# Patient Record
Sex: FEMALE | Race: WHITE | Employment: OTHER | URBAN - METROPOLITAN AREA
[De-identification: names, ages, dates, MRNs, and addresses within clinical notes are randomized per-mention and may not be internally consistent; named-entity substitution may affect disease eponyms.]

---

## 2018-01-01 ENCOUNTER — HOSPITAL ENCOUNTER (INPATIENT)
Age: 83
LOS: 5 days | DRG: 374 | End: 2018-08-13
Attending: INTERNAL MEDICINE | Admitting: INTERNAL MEDICINE
Payer: COMMERCIAL

## 2018-01-01 ENCOUNTER — HOSPITAL ENCOUNTER (INPATIENT)
Age: 83
LOS: 6 days | Discharge: HOSPICE | DRG: 374 | End: 2018-08-08
Attending: INTERNAL MEDICINE | Admitting: INTERNAL MEDICINE
Payer: MEDICARE

## 2018-01-01 ENCOUNTER — APPOINTMENT (OUTPATIENT)
Dept: GENERAL RADIOLOGY | Age: 83
End: 2018-01-01
Payer: MEDICARE

## 2018-01-01 ENCOUNTER — APPOINTMENT (OUTPATIENT)
Dept: GENERAL RADIOLOGY | Age: 83
DRG: 374 | End: 2018-01-01
Attending: INTERNAL MEDICINE
Payer: MEDICARE

## 2018-01-01 ENCOUNTER — APPOINTMENT (OUTPATIENT)
Dept: CT IMAGING | Age: 83
DRG: 374 | End: 2018-01-01
Attending: INTERNAL MEDICINE
Payer: MEDICARE

## 2018-01-01 ENCOUNTER — HOSPITAL ENCOUNTER (EMERGENCY)
Age: 83
Discharge: HOME | End: 2018-07-26
Payer: MEDICARE

## 2018-01-01 ENCOUNTER — HOSPITAL ENCOUNTER (EMERGENCY)
Age: 83
Discharge: HOME | End: 2018-07-26
Attending: EMERGENCY MEDICINE
Payer: MEDICARE

## 2018-01-01 VITALS
SYSTOLIC BLOOD PRESSURE: 106 MMHG | HEART RATE: 95 BPM | BODY MASS INDEX: 22.22 KG/M2 | HEIGHT: 63 IN | DIASTOLIC BLOOD PRESSURE: 55 MMHG | RESPIRATION RATE: 18 BRPM | OXYGEN SATURATION: 98 % | TEMPERATURE: 100.6 F | WEIGHT: 125.4 LBS

## 2018-01-01 VITALS
SYSTOLIC BLOOD PRESSURE: 78 MMHG | RESPIRATION RATE: 28 BRPM | TEMPERATURE: 100.3 F | OXYGEN SATURATION: 94 % | DIASTOLIC BLOOD PRESSURE: 36 MMHG | HEART RATE: 114 BPM

## 2018-01-01 VITALS
TEMPERATURE: 100.1 F | DIASTOLIC BLOOD PRESSURE: 88 MMHG | BODY MASS INDEX: 22.68 KG/M2 | HEIGHT: 63 IN | WEIGHT: 128 LBS | SYSTOLIC BLOOD PRESSURE: 175 MMHG | OXYGEN SATURATION: 94 % | HEART RATE: 97 BPM | RESPIRATION RATE: 18 BRPM

## 2018-01-01 VITALS
SYSTOLIC BLOOD PRESSURE: 199 MMHG | DIASTOLIC BLOOD PRESSURE: 98 MMHG | HEART RATE: 106 BPM | TEMPERATURE: 98.1 F | OXYGEN SATURATION: 94 % | RESPIRATION RATE: 20 BRPM

## 2018-01-01 DIAGNOSIS — R50.9 FEBRILE ILLNESS: Primary | ICD-10-CM

## 2018-01-01 DIAGNOSIS — R53.83 FATIGUE, UNSPECIFIED TYPE: Primary | ICD-10-CM

## 2018-01-01 DIAGNOSIS — R50.9 FEVER OF UNKNOWN ORIGIN: ICD-10-CM

## 2018-01-01 LAB
ALBUMIN SERPL-MCNC: 2.5 G/DL (ref 3.5–5.2)
ALBUMIN SERPL-MCNC: 2.9 G/DL (ref 3.5–5.2)
ALBUMIN SERPL-MCNC: 3 G/DL (ref 3.5–5.2)
ALP BLD-CCNC: 101 U/L (ref 35–104)
ALP BLD-CCNC: 121 U/L (ref 35–104)
ALP BLD-CCNC: 151 U/L (ref 35–104)
ALT SERPL-CCNC: 18 U/L (ref 0–32)
ALT SERPL-CCNC: 21 U/L (ref 0–32)
ALT SERPL-CCNC: 26 U/L (ref 0–32)
AMORPHOUS: NORMAL
ANION GAP SERPL CALCULATED.3IONS-SCNC: 11 MMOL/L (ref 7–16)
ANION GAP SERPL CALCULATED.3IONS-SCNC: 12 MMOL/L (ref 7–16)
ANION GAP SERPL CALCULATED.3IONS-SCNC: 12 MMOL/L (ref 7–16)
AST SERPL-CCNC: 28 U/L (ref 0–31)
AST SERPL-CCNC: 33 U/L (ref 0–31)
AST SERPL-CCNC: 43 U/L (ref 0–31)
BACTERIA: ABNORMAL /HPF
BACTERIA: NORMAL /HPF
BASOPHILS ABSOLUTE: 0.05 E9/L (ref 0–0.2)
BASOPHILS ABSOLUTE: 0.06 E9/L (ref 0–0.2)
BASOPHILS RELATIVE PERCENT: 0.5 % (ref 0–2)
BASOPHILS RELATIVE PERCENT: 0.9 % (ref 0–2)
BILIRUB SERPL-MCNC: 0.5 MG/DL (ref 0–1.2)
BILIRUB SERPL-MCNC: 0.7 MG/DL (ref 0–1.2)
BILIRUB SERPL-MCNC: 0.8 MG/DL (ref 0–1.2)
BILIRUBIN URINE: NEGATIVE
BLOOD CULTURE, ROUTINE: NORMAL
BLOOD CULTURE, ROUTINE: NORMAL
BLOOD, URINE: NEGATIVE
BUN BLDV-MCNC: 18 MG/DL (ref 8–23)
BUN BLDV-MCNC: 19 MG/DL (ref 8–23)
BUN BLDV-MCNC: 9 MG/DL (ref 8–23)
CA 125: 91.6 U/ML (ref 0–35)
CA 19-9: 75 U/ML (ref 0–37)
CALCIUM SERPL-MCNC: 10.3 MG/DL (ref 8.6–10.2)
CALCIUM SERPL-MCNC: 8.6 MG/DL (ref 8.6–10.2)
CALCIUM SERPL-MCNC: 9 MG/DL (ref 8.6–10.2)
CEA: 2.3 NG/ML (ref 0–5.2)
CHLORIDE BLD-SCNC: 101 MMOL/L (ref 98–107)
CHLORIDE BLD-SCNC: 91 MMOL/L (ref 98–107)
CHLORIDE BLD-SCNC: 93 MMOL/L (ref 98–107)
CK MB: 11.1 NG/ML (ref 0–4.3)
CLARITY: CLEAR
CO2: 24 MMOL/L (ref 22–29)
CO2: 25 MMOL/L (ref 22–29)
CO2: 27 MMOL/L (ref 22–29)
COLOR: YELLOW
CREAT SERPL-MCNC: 0.7 MG/DL (ref 0.5–1)
CREAT SERPL-MCNC: 0.8 MG/DL (ref 0.5–1)
CREAT SERPL-MCNC: 0.8 MG/DL (ref 0.5–1)
CULTURE, BLOOD 2: NORMAL
CULTURE, BLOOD 2: NORMAL
EKG ATRIAL RATE: 108 BPM
EKG ATRIAL RATE: 159 BPM
EKG ATRIAL RATE: 88 BPM
EKG P AXIS: 44 DEGREES
EKG P AXIS: 74 DEGREES
EKG P-R INTERVAL: 176 MS
EKG P-R INTERVAL: 190 MS
EKG Q-T INTERVAL: 294 MS
EKG Q-T INTERVAL: 306 MS
EKG Q-T INTERVAL: 346 MS
EKG QRS DURATION: 162 MS
EKG QRS DURATION: 76 MS
EKG QRS DURATION: 80 MS
EKG QTC CALCULATION (BAZETT): 410 MS
EKG QTC CALCULATION (BAZETT): 418 MS
EKG QTC CALCULATION (BAZETT): 473 MS
EKG R AXIS: 24 DEGREES
EKG R AXIS: 43 DEGREES
EKG R AXIS: 49 DEGREES
EKG T AXIS: 35 DEGREES
EKG T AXIS: 57 DEGREES
EKG T AXIS: 97 DEGREES
EKG VENTRICULAR RATE: 108 BPM
EKG VENTRICULAR RATE: 156 BPM
EKG VENTRICULAR RATE: 88 BPM
EOSINOPHILS ABSOLUTE: 0 E9/L (ref 0.05–0.5)
EOSINOPHILS ABSOLUTE: 0.06 E9/L (ref 0.05–0.5)
EOSINOPHILS RELATIVE PERCENT: 0.7 % (ref 0–6)
EOSINOPHILS RELATIVE PERCENT: 0.9 % (ref 0–6)
EPITHELIAL CELLS, UA: ABNORMAL /HPF
FERRITIN: 139 NG/ML
FOLATE: 8.6 NG/ML (ref 4.8–24.2)
GFR AFRICAN AMERICAN: >60
GFR NON-AFRICAN AMERICAN: >60 ML/MIN/1.73
GLUCOSE BLD-MCNC: 89 MG/DL (ref 74–109)
GLUCOSE BLD-MCNC: 95 MG/DL (ref 74–109)
GLUCOSE BLD-MCNC: 96 MG/DL (ref 74–109)
GLUCOSE URINE: NEGATIVE MG/DL
HCT VFR BLD CALC: 31.4 % (ref 34–48)
HCT VFR BLD CALC: 32.4 % (ref 34–48)
HCT VFR BLD CALC: 36.5 % (ref 34–48)
HEMOGLOBIN: 10.7 G/DL (ref 11.5–15.5)
HEMOGLOBIN: 10.8 G/DL (ref 11.5–15.5)
HEMOGLOBIN: 12.2 G/DL (ref 11.5–15.5)
IRON SATURATION: 32 % (ref 15–50)
IRON: 58 MCG/DL (ref 37–145)
KETONES, URINE: NEGATIVE MG/DL
LACTIC ACID: 1.6 MMOL/L (ref 0.5–2.2)
LEUKOCYTE ESTERASE, URINE: NEGATIVE
LIPASE: 38 U/L (ref 13–60)
LIPASE: 58 U/L (ref 13–60)
LV EF: 62 %
LVEF MODALITY: NORMAL
LYMPHOCYTES ABSOLUTE: 0.63 E9/L (ref 1.5–4)
LYMPHOCYTES ABSOLUTE: 1.18 E9/L (ref 1.5–4)
LYMPHOCYTES RELATIVE PERCENT: 11.1 % (ref 20–42)
LYMPHOCYTES RELATIVE PERCENT: 8.7 % (ref 20–42)
MCH RBC QN AUTO: 27.1 PG (ref 26–35)
MCH RBC QN AUTO: 27.4 PG (ref 26–35)
MCH RBC QN AUTO: 27.5 PG (ref 26–35)
MCHC RBC AUTO-ENTMCNC: 33.3 % (ref 32–34.5)
MCHC RBC AUTO-ENTMCNC: 33.4 % (ref 32–34.5)
MCHC RBC AUTO-ENTMCNC: 34.1 % (ref 32–34.5)
MCV RBC AUTO: 80.5 FL (ref 80–99.9)
MCV RBC AUTO: 81.2 FL (ref 80–99.9)
MCV RBC AUTO: 82.2 FL (ref 80–99.9)
METER GLUCOSE: 98 MG/DL (ref 70–110)
MONOCYTES ABSOLUTE: 1.89 E9/L (ref 0.1–0.95)
MONOCYTES ABSOLUTE: 3 E9/L (ref 0.1–0.95)
MONOCYTES RELATIVE PERCENT: 27 % (ref 2–12)
MONOCYTES RELATIVE PERCENT: 28.1 % (ref 2–12)
NEUTROPHILS ABSOLUTE: 4.41 E9/L (ref 1.8–7.3)
NEUTROPHILS ABSOLUTE: 6.42 E9/L (ref 1.8–7.3)
NEUTROPHILS RELATIVE PERCENT: 60.3 % (ref 43–80)
NEUTROPHILS RELATIVE PERCENT: 62.6 % (ref 43–80)
NITRITE, URINE: NEGATIVE
OVALOCYTES: ABNORMAL
PDW BLD-RTO: 14.5 FL (ref 11.5–15)
PDW BLD-RTO: 14.6 FL (ref 11.5–15)
PDW BLD-RTO: 14.8 FL (ref 11.5–15)
PH UA: 7 (ref 5–9)
PLATELET # BLD: 274 E9/L (ref 130–450)
PLATELET # BLD: 279 E9/L (ref 130–450)
PLATELET # BLD: 314 E9/L (ref 130–450)
PMV BLD AUTO: 10.5 FL (ref 7–12)
PMV BLD AUTO: 10.6 FL (ref 7–12)
PMV BLD AUTO: 10.7 FL (ref 7–12)
POIKILOCYTES: ABNORMAL
POTASSIUM SERPL-SCNC: 3.4 MMOL/L (ref 3.5–5)
POTASSIUM SERPL-SCNC: 3.5 MMOL/L (ref 3.5–5)
POTASSIUM SERPL-SCNC: 5.3 MMOL/L (ref 3.5–5)
PRO-BNP: 966 PG/ML (ref 0–450)
PROTEIN UA: NEGATIVE MG/DL
PROTEIN UA: NEGATIVE MG/DL
PROTEIN UA: NORMAL MG/DL
RBC # BLD: 3.9 E12/L (ref 3.5–5.5)
RBC # BLD: 3.99 E12/L (ref 3.5–5.5)
RBC # BLD: 4.44 E12/L (ref 3.5–5.5)
RBC UA: ABNORMAL /HPF (ref 0–2)
RBC UA: NORMAL /HPF (ref 0–2)
SODIUM BLD-SCNC: 130 MMOL/L (ref 132–146)
SODIUM BLD-SCNC: 130 MMOL/L (ref 132–146)
SODIUM BLD-SCNC: 136 MMOL/L (ref 132–146)
SPECIFIC GRAVITY UA: 1.01 (ref 1–1.03)
SPECIFIC GRAVITY UA: 1.01 (ref 1–1.03)
SPECIFIC GRAVITY UA: <=1.005 (ref 1–1.03)
TOTAL CK: 106 U/L (ref 20–180)
TOTAL IRON BINDING CAPACITY: 184 MCG/DL (ref 250–450)
TOTAL PROTEIN: 6.9 G/DL (ref 6.4–8.3)
TOTAL PROTEIN: 7.5 G/DL (ref 6.4–8.3)
TOTAL PROTEIN: 8.2 G/DL (ref 6.4–8.3)
TOXIC GRANULATION: ABNORMAL
TROPONIN: 0.56 NG/ML (ref 0–0.03)
TROPONIN: <0.01 NG/ML (ref 0–0.03)
URINE CULTURE, ROUTINE: NORMAL
UROBILINOGEN, URINE: 0.2 E.U./DL
VACUOLATED NEUTROPHILS: ABNORMAL
VITAMIN B-12: 621 PG/ML (ref 211–946)
WBC # BLD: 10.7 E9/L (ref 4.5–11.5)
WBC # BLD: 11.3 E9/L (ref 4.5–11.5)
WBC # BLD: 7 E9/L (ref 4.5–11.5)
WBC UA: ABNORMAL /HPF (ref 0–5)
WBC UA: NORMAL /HPF (ref 0–5)

## 2018-01-01 PROCEDURE — 6360000002 HC RX W HCPCS

## 2018-01-01 PROCEDURE — 87088 URINE BACTERIA CULTURE: CPT

## 2018-01-01 PROCEDURE — 6360000002 HC RX W HCPCS: Performed by: INTERNAL MEDICINE

## 2018-01-01 PROCEDURE — 2500000003 HC RX 250 WO HCPCS: Performed by: INTERNAL MEDICINE

## 2018-01-01 PROCEDURE — 83690 ASSAY OF LIPASE: CPT

## 2018-01-01 PROCEDURE — 6370000000 HC RX 637 (ALT 250 FOR IP): Performed by: INTERNAL MEDICINE

## 2018-01-01 PROCEDURE — 1200000000 HC SEMI PRIVATE

## 2018-01-01 PROCEDURE — 2580000003 HC RX 258: Performed by: INTERNAL MEDICINE

## 2018-01-01 PROCEDURE — 36415 COLL VENOUS BLD VENIPUNCTURE: CPT

## 2018-01-01 PROCEDURE — 82553 CREATINE MB FRACTION: CPT

## 2018-01-01 PROCEDURE — 80053 COMPREHEN METABOLIC PANEL: CPT

## 2018-01-01 PROCEDURE — 6370000000 HC RX 637 (ALT 250 FOR IP): Performed by: NURSE PRACTITIONER

## 2018-01-01 PROCEDURE — 83540 ASSAY OF IRON: CPT

## 2018-01-01 PROCEDURE — 93005 ELECTROCARDIOGRAM TRACING: CPT | Performed by: INTERNAL MEDICINE

## 2018-01-01 PROCEDURE — 87040 BLOOD CULTURE FOR BACTERIA: CPT

## 2018-01-01 PROCEDURE — 82607 VITAMIN B-12: CPT

## 2018-01-01 PROCEDURE — C9113 INJ PANTOPRAZOLE SODIUM, VIA: HCPCS | Performed by: INTERNAL MEDICINE

## 2018-01-01 PROCEDURE — 81003 URINALYSIS AUTO W/O SCOPE: CPT

## 2018-01-01 PROCEDURE — 83550 IRON BINDING TEST: CPT

## 2018-01-01 PROCEDURE — 82746 ASSAY OF FOLIC ACID SERUM: CPT

## 2018-01-01 PROCEDURE — 3609008400 HC SIGMOIDOSCOPY DIAGNOSTIC: Performed by: INTERNAL MEDICINE

## 2018-01-01 PROCEDURE — 86301 IMMUNOASSAY TUMOR CA 19-9: CPT

## 2018-01-01 PROCEDURE — 85025 COMPLETE CBC W/AUTO DIFF WBC: CPT

## 2018-01-01 PROCEDURE — 86304 IMMUNOASSAY TUMOR CA 125: CPT

## 2018-01-01 PROCEDURE — 81001 URINALYSIS AUTO W/SCOPE: CPT

## 2018-01-01 PROCEDURE — 99222 1ST HOSP IP/OBS MODERATE 55: CPT | Performed by: SURGERY

## 2018-01-01 PROCEDURE — 6360000004 HC RX CONTRAST MEDICATION: Performed by: RADIOLOGY

## 2018-01-01 PROCEDURE — 2700000000 HC OXYGEN THERAPY PER DAY

## 2018-01-01 PROCEDURE — 6370000000 HC RX 637 (ALT 250 FOR IP): Performed by: PHYSICIAN ASSISTANT

## 2018-01-01 PROCEDURE — 84484 ASSAY OF TROPONIN QUANT: CPT

## 2018-01-01 PROCEDURE — 71045 X-RAY EXAM CHEST 1 VIEW: CPT

## 2018-01-01 PROCEDURE — 83880 ASSAY OF NATRIURETIC PEPTIDE: CPT

## 2018-01-01 PROCEDURE — 82378 CARCINOEMBRYONIC ANTIGEN: CPT

## 2018-01-01 PROCEDURE — 82962 GLUCOSE BLOOD TEST: CPT

## 2018-01-01 PROCEDURE — 74178 CT ABD&PLV WO CNTR FLWD CNTR: CPT

## 2018-01-01 PROCEDURE — 2709999900 HC NON-CHARGEABLE SUPPLY: Performed by: INTERNAL MEDICINE

## 2018-01-01 PROCEDURE — 82728 ASSAY OF FERRITIN: CPT

## 2018-01-01 PROCEDURE — 83605 ASSAY OF LACTIC ACID: CPT

## 2018-01-01 PROCEDURE — 99231 SBSQ HOSP IP/OBS SF/LOW 25: CPT | Performed by: SURGERY

## 2018-01-01 PROCEDURE — 0DJD8ZZ INSPECTION OF LOWER INTESTINAL TRACT, VIA NATURAL OR ARTIFICIAL OPENING ENDOSCOPIC: ICD-10-PCS | Performed by: INTERNAL MEDICINE

## 2018-01-01 PROCEDURE — 2580000003 HC RX 258: Performed by: PHYSICIAN ASSISTANT

## 2018-01-01 PROCEDURE — 82550 ASSAY OF CK (CPK): CPT

## 2018-01-01 PROCEDURE — 93306 TTE W/DOPPLER COMPLETE: CPT

## 2018-01-01 PROCEDURE — 85027 COMPLETE CBC AUTOMATED: CPT

## 2018-01-01 PROCEDURE — 99212 OFFICE O/P EST SF 10 MIN: CPT

## 2018-01-01 PROCEDURE — 71046 X-RAY EXAM CHEST 2 VIEWS: CPT

## 2018-01-01 PROCEDURE — 99285 EMERGENCY DEPT VISIT HI MDM: CPT

## 2018-01-01 RX ORDER — CLONIDINE HYDROCHLORIDE 0.1 MG/1
0.1 TABLET ORAL 2 TIMES DAILY
Status: DISCONTINUED | OUTPATIENT
Start: 2018-01-01 | End: 2018-01-01

## 2018-01-01 RX ORDER — LORAZEPAM 2 MG/ML
0.5 INJECTION INTRAMUSCULAR EVERY 4 HOURS PRN
Status: DISCONTINUED | OUTPATIENT
Start: 2018-01-01 | End: 2018-08-14 | Stop reason: HOSPADM

## 2018-01-01 RX ORDER — FUROSEMIDE 10 MG/ML
INJECTION INTRAMUSCULAR; INTRAVENOUS
Status: COMPLETED
Start: 2018-01-01 | End: 2018-01-01

## 2018-01-01 RX ORDER — BISACODYL 10 MG
10 SUPPOSITORY, RECTAL RECTAL DAILY PRN
Status: CANCELLED | OUTPATIENT
Start: 2018-01-01

## 2018-01-01 RX ORDER — GLYCOPYRROLATE 0.2 MG/ML
0.4 INJECTION INTRAMUSCULAR; INTRAVENOUS 3 TIMES DAILY
Status: CANCELLED | OUTPATIENT
Start: 2018-01-01

## 2018-01-01 RX ORDER — POTASSIUM CHLORIDE 20 MEQ/1
20 TABLET, EXTENDED RELEASE ORAL ONCE
Status: COMPLETED | OUTPATIENT
Start: 2018-01-01 | End: 2018-01-01

## 2018-01-01 RX ORDER — ACETAMINOPHEN 325 MG/1
650 TABLET ORAL ONCE
Status: COMPLETED | OUTPATIENT
Start: 2018-01-01 | End: 2018-01-01

## 2018-01-01 RX ORDER — ONDANSETRON 2 MG/ML
8 INJECTION INTRAMUSCULAR; INTRAVENOUS EVERY 8 HOURS PRN
Status: CANCELLED | OUTPATIENT
Start: 2018-01-01

## 2018-01-01 RX ORDER — METOPROLOL SUCCINATE 50 MG/1
50 TABLET, EXTENDED RELEASE ORAL DAILY
Status: DISCONTINUED | OUTPATIENT
Start: 2018-01-01 | End: 2018-01-01

## 2018-01-01 RX ORDER — CIPROFLOXACIN 2 MG/ML
400 INJECTION, SOLUTION INTRAVENOUS EVERY 12 HOURS
Status: DISCONTINUED | OUTPATIENT
Start: 2018-01-01 | End: 2018-01-01

## 2018-01-01 RX ORDER — PANTOPRAZOLE SODIUM 40 MG/1
40 TABLET, DELAYED RELEASE ORAL
Status: DISCONTINUED | OUTPATIENT
Start: 2018-01-01 | End: 2018-01-01 | Stop reason: SDUPTHER

## 2018-01-01 RX ORDER — PROMETHAZINE HYDROCHLORIDE 25 MG/ML
25 INJECTION, SOLUTION INTRAMUSCULAR; INTRAVENOUS EVERY 6 HOURS PRN
Status: DISCONTINUED | OUTPATIENT
Start: 2018-01-01 | End: 2018-01-01

## 2018-01-01 RX ORDER — LORAZEPAM 2 MG/ML
0.4 INJECTION INTRAMUSCULAR EVERY 4 HOURS PRN
Status: DISCONTINUED | OUTPATIENT
Start: 2018-01-01 | End: 2018-01-01

## 2018-01-01 RX ORDER — ONDANSETRON 2 MG/ML
4 INJECTION INTRAMUSCULAR; INTRAVENOUS ONCE
Status: COMPLETED | OUTPATIENT
Start: 2018-01-01 | End: 2018-01-01

## 2018-01-01 RX ORDER — CLONIDINE HYDROCHLORIDE 0.1 MG/1
0.1 TABLET ORAL 2 TIMES DAILY
Status: DISCONTINUED | OUTPATIENT
Start: 2018-01-01 | End: 2018-01-01 | Stop reason: HOSPADM

## 2018-01-01 RX ORDER — 0.9 % SODIUM CHLORIDE 0.9 %
10 VIAL (ML) INJECTION DAILY
Status: DISCONTINUED | OUTPATIENT
Start: 2018-01-01 | End: 2018-01-01 | Stop reason: HOSPADM

## 2018-01-01 RX ORDER — 0.9 % SODIUM CHLORIDE 0.9 %
1000 INTRAVENOUS SOLUTION INTRAVENOUS ONCE
Status: COMPLETED | OUTPATIENT
Start: 2018-01-01 | End: 2018-01-01

## 2018-01-01 RX ORDER — ASPIRIN 325 MG
325 TABLET ORAL DAILY
Status: ON HOLD | COMMUNITY
End: 2018-01-01

## 2018-01-01 RX ORDER — ACETAMINOPHEN 650 MG/1
650 SUPPOSITORY RECTAL EVERY 4 HOURS PRN
Status: CANCELLED | OUTPATIENT
Start: 2018-01-01

## 2018-01-01 RX ORDER — ONDANSETRON 2 MG/ML
8 INJECTION INTRAMUSCULAR; INTRAVENOUS EVERY 8 HOURS PRN
Status: DISCONTINUED | OUTPATIENT
Start: 2018-01-01 | End: 2018-08-14 | Stop reason: HOSPADM

## 2018-01-01 RX ORDER — GLYCOPYRROLATE 0.2 MG/ML
0.4 INJECTION INTRAMUSCULAR; INTRAVENOUS 3 TIMES DAILY
Status: DISCONTINUED | OUTPATIENT
Start: 2018-01-01 | End: 2018-08-14 | Stop reason: HOSPADM

## 2018-01-01 RX ORDER — METOPROLOL SUCCINATE 25 MG/1
50 TABLET, EXTENDED RELEASE ORAL ONCE
Status: COMPLETED | OUTPATIENT
Start: 2018-01-01 | End: 2018-01-01

## 2018-01-01 RX ORDER — BISACODYL 10 MG
10 SUPPOSITORY, RECTAL RECTAL DAILY PRN
Status: DISCONTINUED | OUTPATIENT
Start: 2018-01-01 | End: 2018-08-14 | Stop reason: HOSPADM

## 2018-01-01 RX ORDER — PANTOPRAZOLE SODIUM 40 MG/10ML
40 INJECTION, POWDER, LYOPHILIZED, FOR SOLUTION INTRAVENOUS DAILY
Status: DISCONTINUED | OUTPATIENT
Start: 2018-01-01 | End: 2018-01-01 | Stop reason: HOSPADM

## 2018-01-01 RX ORDER — LORAZEPAM 2 MG/ML
0.4 INJECTION INTRAMUSCULAR EVERY 4 HOURS PRN
Status: CANCELLED | OUTPATIENT
Start: 2018-01-01

## 2018-01-01 RX ORDER — ONDANSETRON 2 MG/ML
4 INJECTION INTRAMUSCULAR; INTRAVENOUS EVERY 8 HOURS PRN
Status: DISCONTINUED | OUTPATIENT
Start: 2018-01-01 | End: 2018-01-01 | Stop reason: SDUPTHER

## 2018-01-01 RX ORDER — SODIUM CHLORIDE 9 MG/ML
INJECTION, SOLUTION INTRAVENOUS CONTINUOUS
Status: DISCONTINUED | OUTPATIENT
Start: 2018-01-01 | End: 2018-01-01

## 2018-01-01 RX ORDER — CLONIDINE HYDROCHLORIDE 0.2 MG/1
0.2 TABLET ORAL 2 TIMES DAILY
Status: DISCONTINUED | OUTPATIENT
Start: 2018-01-01 | End: 2018-01-01

## 2018-01-01 RX ORDER — ACETAMINOPHEN 650 MG/1
650 SUPPOSITORY RECTAL EVERY 4 HOURS PRN
Status: DISCONTINUED | OUTPATIENT
Start: 2018-01-01 | End: 2018-01-01 | Stop reason: HOSPADM

## 2018-01-01 RX ORDER — CLOTRIMAZOLE AND BETAMETHASONE DIPROPIONATE 10; .64 MG/G; MG/G
CREAM TOPICAL 2 TIMES DAILY
Status: DISCONTINUED | OUTPATIENT
Start: 2018-01-01 | End: 2018-08-14 | Stop reason: HOSPADM

## 2018-01-01 RX ORDER — ONDANSETRON 2 MG/ML
8 INJECTION INTRAMUSCULAR; INTRAVENOUS EVERY 8 HOURS PRN
Status: DISCONTINUED | OUTPATIENT
Start: 2018-01-01 | End: 2018-01-01 | Stop reason: HOSPADM

## 2018-01-01 RX ORDER — CLONIDINE HYDROCHLORIDE 0.1 MG/1
0.3 TABLET ORAL NIGHTLY
Status: ON HOLD | COMMUNITY
End: 2018-01-01

## 2018-01-01 RX ORDER — ACETAMINOPHEN 650 MG/1
650 SUPPOSITORY RECTAL EVERY 4 HOURS PRN
Status: DISCONTINUED | OUTPATIENT
Start: 2018-01-01 | End: 2018-08-14 | Stop reason: HOSPADM

## 2018-01-01 RX ORDER — HYDROCODONE BITARTRATE AND ACETAMINOPHEN 5; 325 MG/1; MG/1
1 TABLET ORAL EVERY 6 HOURS PRN
Status: DISCONTINUED | OUTPATIENT
Start: 2018-01-01 | End: 2018-01-01

## 2018-01-01 RX ORDER — DOCUSATE SODIUM 100 MG/1
200 CAPSULE, LIQUID FILLED ORAL 2 TIMES DAILY PRN
Status: ON HOLD | COMMUNITY
Start: 2006-10-12 | End: 2018-01-01

## 2018-01-01 RX ORDER — FUROSEMIDE 10 MG/ML
40 INJECTION INTRAMUSCULAR; INTRAVENOUS ONCE
Status: COMPLETED | OUTPATIENT
Start: 2018-01-01 | End: 2018-01-01

## 2018-01-01 RX ADMIN — CIPROFLOXACIN 400 MG: 2 INJECTION, SOLUTION INTRAVENOUS at 21:08

## 2018-01-01 RX ADMIN — CLONIDINE HYDROCHLORIDE 0.2 MG: 0.2 TABLET ORAL at 08:29

## 2018-01-01 RX ADMIN — GLYCOPYRROLATE 0.4 MG: 0.2 INJECTION, SOLUTION INTRAMUSCULAR; INTRAVENOUS at 09:51

## 2018-01-01 RX ADMIN — CLOTRIMAZOLE AND BETAMETHASONE DIPROPIONATE: 10; .5 CREAM TOPICAL at 08:28

## 2018-01-01 RX ADMIN — POTASSIUM CHLORIDE 20 MEQ: 20 TABLET, EXTENDED RELEASE ORAL at 20:57

## 2018-01-01 RX ADMIN — SODIUM CHLORIDE 1000 ML: 9 INJECTION, SOLUTION INTRAVENOUS at 18:50

## 2018-01-01 RX ADMIN — FUROSEMIDE 40 MG: 10 INJECTION, SOLUTION INTRAMUSCULAR; INTRAVENOUS at 22:04

## 2018-01-01 RX ADMIN — METRONIDAZOLE 500 MG: 500 INJECTION, SOLUTION INTRAVENOUS at 10:12

## 2018-01-01 RX ADMIN — GLYCOPYRROLATE 0.4 MG: 0.2 INJECTION, SOLUTION INTRAMUSCULAR; INTRAVENOUS at 14:18

## 2018-01-01 RX ADMIN — METRONIDAZOLE 500 MG: 500 INJECTION, SOLUTION INTRAVENOUS at 10:32

## 2018-01-01 RX ADMIN — ONDANSETRON 4 MG: 2 INJECTION INTRAMUSCULAR; INTRAVENOUS at 22:29

## 2018-01-01 RX ADMIN — GLYCOPYRROLATE 0.4 MG: 0.2 INJECTION, SOLUTION INTRAMUSCULAR; INTRAVENOUS at 09:17

## 2018-01-01 RX ADMIN — GLYCOPYRROLATE 0.4 MG: 0.2 INJECTION, SOLUTION INTRAMUSCULAR; INTRAVENOUS at 14:28

## 2018-01-01 RX ADMIN — MORPHINE SULFATE 2 MG/HR: 25 INJECTION, SOLUTION, CONCENTRATE INTRAVENOUS at 01:51

## 2018-01-01 RX ADMIN — CLONIDINE HYDROCHLORIDE 0.1 MG: 0.1 TABLET ORAL at 21:29

## 2018-01-01 RX ADMIN — METRONIDAZOLE 500 MG: 500 INJECTION, SOLUTION INTRAVENOUS at 08:29

## 2018-01-01 RX ADMIN — GLYCOPYRROLATE 0.4 MG: 0.2 INJECTION, SOLUTION INTRAMUSCULAR; INTRAVENOUS at 08:48

## 2018-01-01 RX ADMIN — PANTOPRAZOLE SODIUM 40 MG: 40 TABLET, DELAYED RELEASE ORAL at 06:09

## 2018-01-01 RX ADMIN — GLYCOPYRROLATE 0.4 MG: 0.2 INJECTION, SOLUTION INTRAMUSCULAR; INTRAVENOUS at 08:27

## 2018-01-01 RX ADMIN — ACETAMINOPHEN 650 MG: 650 SUPPOSITORY RECTAL at 20:08

## 2018-01-01 RX ADMIN — METOPROLOL SUCCINATE 50 MG: 50 TABLET, EXTENDED RELEASE ORAL at 10:32

## 2018-01-01 RX ADMIN — CLONIDINE HYDROCHLORIDE 0.1 MG: 0.1 TABLET ORAL at 20:56

## 2018-01-01 RX ADMIN — GLYCOPYRROLATE 0.4 MG: 0.2 INJECTION, SOLUTION INTRAMUSCULAR; INTRAVENOUS at 15:03

## 2018-01-01 RX ADMIN — CIPROFLOXACIN 400 MG: 2 INJECTION, SOLUTION INTRAVENOUS at 03:06

## 2018-01-01 RX ADMIN — GLYCOPYRROLATE 0.4 MG: 0.2 INJECTION, SOLUTION INTRAMUSCULAR; INTRAVENOUS at 21:45

## 2018-01-01 RX ADMIN — CLONIDINE HYDROCHLORIDE 0.1 MG: 0.1 TABLET ORAL at 21:35

## 2018-01-01 RX ADMIN — PANTOPRAZOLE SODIUM 40 MG: 40 INJECTION, POWDER, FOR SOLUTION INTRAVENOUS at 22:42

## 2018-01-01 RX ADMIN — FUROSEMIDE 40 MG: 10 INJECTION INTRAMUSCULAR; INTRAVENOUS at 22:04

## 2018-01-01 RX ADMIN — METRONIDAZOLE 500 MG: 500 INJECTION, SOLUTION INTRAVENOUS at 20:56

## 2018-01-01 RX ADMIN — SODIUM CHLORIDE: 9 INJECTION, SOLUTION INTRAVENOUS at 08:29

## 2018-01-01 RX ADMIN — SODIUM CHLORIDE 10 ML: 9 INJECTION INTRAMUSCULAR; INTRAVENOUS; SUBCUTANEOUS at 22:42

## 2018-01-01 RX ADMIN — METOPROLOL SUCCINATE 50 MG: 50 TABLET, EXTENDED RELEASE ORAL at 21:08

## 2018-01-01 RX ADMIN — SODIUM CHLORIDE 10 ML: 9 INJECTION INTRAMUSCULAR; INTRAVENOUS; SUBCUTANEOUS at 09:46

## 2018-01-01 RX ADMIN — METOPROLOL SUCCINATE 50 MG: 50 TABLET, EXTENDED RELEASE ORAL at 09:53

## 2018-01-01 RX ADMIN — METRONIDAZOLE 500 MG: 500 INJECTION, SOLUTION INTRAVENOUS at 20:58

## 2018-01-01 RX ADMIN — GLYCOPYRROLATE 0.4 MG: 0.2 INJECTION, SOLUTION INTRAMUSCULAR; INTRAVENOUS at 09:22

## 2018-01-01 RX ADMIN — ACETAMINOPHEN 650 MG: 325 TABLET, FILM COATED ORAL at 20:34

## 2018-01-01 RX ADMIN — CIPROFLOXACIN 400 MG: 2 INJECTION, SOLUTION INTRAVENOUS at 13:00

## 2018-01-01 RX ADMIN — GLYCOPYRROLATE 0.4 MG: 0.2 INJECTION, SOLUTION INTRAMUSCULAR; INTRAVENOUS at 21:11

## 2018-01-01 RX ADMIN — MORPHINE SULFATE 3 MG/HR: 25 INJECTION, SOLUTION INTRAVENOUS at 09:33

## 2018-01-01 RX ADMIN — CIPROFLOXACIN 400 MG: 2 INJECTION, SOLUTION INTRAVENOUS at 14:53

## 2018-01-01 RX ADMIN — GLYCOPYRROLATE 0.4 MG: 0.2 INJECTION, SOLUTION INTRAMUSCULAR; INTRAVENOUS at 14:26

## 2018-01-01 RX ADMIN — MORPHINE SULFATE 3 MG/HR: 25 INJECTION, SOLUTION INTRAVENOUS at 22:44

## 2018-01-01 RX ADMIN — CIPROFLOXACIN 400 MG: 2 INJECTION, SOLUTION INTRAVENOUS at 14:29

## 2018-01-01 RX ADMIN — SODIUM CHLORIDE 10 ML: 9 INJECTION INTRAMUSCULAR; INTRAVENOUS; SUBCUTANEOUS at 09:37

## 2018-01-01 RX ADMIN — ACETAMINOPHEN 650 MG: 650 SUPPOSITORY RECTAL at 22:44

## 2018-01-01 RX ADMIN — GLYCOPYRROLATE 0.4 MG: 0.2 INJECTION, SOLUTION INTRAMUSCULAR; INTRAVENOUS at 21:05

## 2018-01-01 RX ADMIN — ONDANSETRON 8 MG: 2 INJECTION INTRAMUSCULAR; INTRAVENOUS at 10:01

## 2018-01-01 RX ADMIN — METRONIDAZOLE 500 MG: 500 INJECTION, SOLUTION INTRAVENOUS at 21:08

## 2018-01-01 RX ADMIN — CLONIDINE HYDROCHLORIDE 0.2 MG: 0.2 TABLET ORAL at 20:14

## 2018-01-01 RX ADMIN — GLYCOPYRROLATE 0.4 MG: 0.2 INJECTION, SOLUTION INTRAMUSCULAR; INTRAVENOUS at 20:08

## 2018-01-01 RX ADMIN — METOPROLOL SUCCINATE 50 MG: 25 TABLET, EXTENDED RELEASE ORAL at 19:25

## 2018-01-01 RX ADMIN — CLONIDINE HYDROCHLORIDE 0.2 MG: 0.2 TABLET ORAL at 20:57

## 2018-01-01 RX ADMIN — IOHEXOL 50 ML: 240 INJECTION, SOLUTION INTRATHECAL; INTRAVASCULAR; INTRAVENOUS; ORAL at 01:16

## 2018-01-01 RX ADMIN — GLYCOPYRROLATE 0.4 MG: 0.2 INJECTION, SOLUTION INTRAMUSCULAR; INTRAVENOUS at 14:22

## 2018-01-01 RX ADMIN — PANTOPRAZOLE SODIUM 40 MG: 40 INJECTION, POWDER, FOR SOLUTION INTRAVENOUS at 09:46

## 2018-01-01 RX ADMIN — CLONIDINE HYDROCHLORIDE 0.1 MG: 0.1 TABLET ORAL at 10:34

## 2018-01-01 RX ADMIN — MORPHINE SULFATE 2 MG/HR: 25 INJECTION, SOLUTION, CONCENTRATE INTRAVENOUS at 19:51

## 2018-01-01 RX ADMIN — PANTOPRAZOLE SODIUM 40 MG: 40 INJECTION, POWDER, FOR SOLUTION INTRAVENOUS at 09:37

## 2018-01-01 RX ADMIN — METRONIDAZOLE 500 MG: 500 INJECTION, SOLUTION INTRAVENOUS at 20:15

## 2018-01-01 RX ADMIN — CLONIDINE HYDROCHLORIDE 0.1 MG: 0.1 TABLET ORAL at 09:53

## 2018-01-01 RX ADMIN — SODIUM CHLORIDE: 9 INJECTION, SOLUTION INTRAVENOUS at 01:08

## 2018-01-01 RX ADMIN — ACETAMINOPHEN 650 MG: 650 SUPPOSITORY RECTAL at 10:16

## 2018-01-01 RX ADMIN — MORPHINE SULFATE 2 MG/HR: 25 INJECTION, SOLUTION INTRAVENOUS at 21:43

## 2018-01-01 RX ADMIN — ONDANSETRON 4 MG: 2 INJECTION INTRAMUSCULAR; INTRAVENOUS at 21:30

## 2018-01-01 RX ADMIN — ONDANSETRON HYDROCHLORIDE 4 MG: 2 INJECTION, SOLUTION INTRAMUSCULAR; INTRAVENOUS at 22:34

## 2018-01-01 RX ADMIN — IOPAMIDOL 80 ML: 755 INJECTION, SOLUTION INTRAVENOUS at 01:16

## 2018-01-01 RX ADMIN — METOPROLOL SUCCINATE 50 MG: 50 TABLET, EXTENDED RELEASE ORAL at 08:29

## 2018-01-01 RX ADMIN — VANCOMYCIN HYDROCHLORIDE 1000 MG: 1 INJECTION, POWDER, LYOPHILIZED, FOR SOLUTION INTRAVENOUS at 22:33

## 2018-01-01 RX ADMIN — SODIUM CHLORIDE: 9 INJECTION, SOLUTION INTRAVENOUS at 18:18

## 2018-01-01 RX ADMIN — GLYCOPYRROLATE 0.4 MG: 0.2 INJECTION, SOLUTION INTRAMUSCULAR; INTRAVENOUS at 22:03

## 2018-01-01 RX ADMIN — CLOTRIMAZOLE AND BETAMETHASONE DIPROPIONATE: 10; .5 CREAM TOPICAL at 23:26

## 2018-01-01 RX ADMIN — GLYCOPYRROLATE 0.4 MG: 0.2 INJECTION, SOLUTION INTRAMUSCULAR; INTRAVENOUS at 14:15

## 2018-01-01 RX ADMIN — SODIUM CHLORIDE: 9 INJECTION, SOLUTION INTRAVENOUS at 21:08

## 2018-01-01 RX ADMIN — ONDANSETRON 4 MG: 2 INJECTION INTRAMUSCULAR; INTRAVENOUS at 20:15

## 2018-01-01 RX ADMIN — CIPROFLOXACIN 400 MG: 2 INJECTION, SOLUTION INTRAVENOUS at 20:56

## 2018-01-01 ASSESSMENT — PAIN SCALES - GENERAL
PAINLEVEL_OUTOF10: 0
PAINLEVEL_OUTOF10: 0
PAINLEVEL_OUTOF10: 7
PAINLEVEL_OUTOF10: 0

## 2018-07-26 NOTE — ED PROVIDER NOTES
ED Attending  CC: Yes    HPI:  7/26/18, Time: 5:50PM.       Lindsay Rosenthal is a 80 y.o. female presenting to the ED for evaluation of worsening fatigue as well as intermittent fever, beginning over the last 3 weeks. The patient apparently was driving a vehicle 15 days ago and has since declined. Her daughter reports that she is very independent and ambulates with a cane and her baseline mental status is stable. The patient has a history of aspiration pneumonia as she has a history of TIA and some swallowing issues. She refused a G tube and she eats only blended foods and liquids. She states that she often coughs when she eats, this is not uncommon for her. She has had a mild cough which has not really been productive of much mucus. She states she has had vomiting about 2 weeks ago which has since resolved. She denies any diarrhea. She had a fever of 102 about 1 week ago and this resolved spontaneously. Her daughter reports that last night she again had a fever of 101, with no known specific source. They brought her to a local Urgent care and they checked a 2 view CXR as well as a UA which was stable. The complaint of the fatigue has been persistent moderate in severity, and worsened by nothing. She denies any chest pain or SOB. She has had no recent falls or injuries. She is unaware of any rash. She did complain of some mild vaginal pain last night when using the bathroom. She denies any dysuria or increased frequency. Her daughter reports that they did have some Cipro at home that she did not finish from a previous infection that they recently gave to her, but they ran out of the medication. ROS:   Pertinent positives and negatives are stated within the HPI, all other systems reviewed and are negative.    --------------------------------------------- PAST HISTORY ---------------------------------------------  Past Medical History:  has a past medical history of Bronchitis; [facility]  Hypertension; over the last 3 weeks. Re-Evaluations:             Re-evaluation. Patients symptoms stable  Patient and daughter advised of need to check some labs as well as CXR and observe. Patient apparently took her Toprol earlier today, but \"coughed it up\". Dose of oral Toprol given in ER as HR elevated as well as her BP. Dr. Fatmata Georges aware. Reviewed labs and xrays with Dr. Fatmata Georges, CMP noted, but specimen hemolyzed. Will not redraw. CXR and urine stable as well. He spoke with patient about admission. 8:15PM Spoke with patient about labs and results. Patient aware that we would like to keep her for fever of unknown origin. Patient does not want to stay in the hospital as she states she has 3 daughters that are nurses and she would like to follow-up with her PCP. I tried to discuss this with patient at length concerning stay in the hospital, but she declined at this time. Stressed that she calls her PCP office tomorrow for close follow-up tomorrow. As nursing  Was doing vitals prior to discharge, patient noted to have low grade temperature. Dose of Tylenol ordered. Pulse ox noted to be low. Will observe temperature and recheck. Nursing will advise of repeat vitals. Repeat vitals were stable. Patient feeling improved and wishes to go home. Nursing reviewed discharge instructions with patient and family. Patient advised that if she changes her mind at any time or feels worse, to return to ER immediately. This patient's ED course included: a personal history and physicial examination, re-evaluation prior to disposition, multiple bedside re-evaluations, IV medications and complex medical decision making and emergency management    This patient has remained hemodynamically stable during their ED course. Counseling:    The emergency provider has spoken with the patient and daughter and discussed todays results, in addition to providing specific details for the plan of care and counseling regarding the diagnosis

## 2018-07-26 NOTE — ED PROVIDER NOTES
Department of Emergency Medicine  [facility]  Provider Note  Admit Date/Time: 7/26/2018  3:53 PM  Room: 07/07  MRN: [id number]  Chief Complaint: Fever (pt not feeling well x 1 week w c/o fatigue and fever - pt took old Cipro and felt better for a few days and now worse again - lower abdominal pressure started yesterday)       History of Present Illness   Source of history provided by:  Patient. History/Exam Limitations: None. Sukhwinder Ervin is a 80 y.o. female with a history of CVA with subsequent dysphagia and aspiration. She has a daughter report that over the last week she has developed an intermittent fever with T-max of 102 as well as generalized weakness and malaise. She did have several episodes of vomiting initially although that has resolved over the last few days. Yesterday, she developed some low abdominal pain with recurrent fever so they brought her in for evaluation. They do note that she had Cipro left over from aspiration pneumonia several months ago which she took a few doses of with mild improvement. The family reports that her generalized weakness as well as dysphasia have been progressive over the last few months and she has had recent increasing sensation of aspiration. She denies any chest pain or shortness of breath. Denies any melena or hematochezia. Denies any urinary symptoms such as dysuria. There have been no focal neurologic deficits but the weakness has been generally nature. ROS    Pertinent positives and negatives are stated within HPI, all other systems reviewed and are negative. Past Surgical History:   Procedure Laterality Date    COLONOSCOPY      ECHO COMPL W DOP COLOR FLOW  6/1/2012         HYSTERECTOMY      SKIN BIOPSY     Social History:  reports that she has never smoked. She has never used smokeless tobacco. She reports that she does not drink alcohol or use drugs. Family History: family history is not on file.   Allergies: dysfunction/failure, electrolyte abnormality, ACS, sepsis, encephalopathy, diverticulitis, appendicitis, etc.    MDM:   This is a 80 y.o. female with a history of previous CVA with dysphagia and aspiration who presents with a one-week history of fever with T-max of 102 as well as increasing generalized weakness and progressive aspiration. On exam, she appears to be mildly dehydrated. Also has some mild bilateral lower quadrant abdominal tenderness. Chest x-ray showed nothing acute. UA was obtained which is unremarkable as well. Discussed the extensive differential with she and the daughter at the bedside. Concern is for sepsis due to her ongoing fever and age. She requires further evaluation which is beyond the scope of the urgent care. The daughter will drive her to the emergency department to facilitate this. Counseling: I discussed the differential, results and discharge plan with the patient and/or family/friend/caregiver if present. I emphasized the importance of follow-up with the physician I referred them to in the timeframe recommended. I explained reasons for the patient to return to the Emergency Department. Additional verbal discharge instructions were also given and discussed with the patient to supplement those generated by the EMR. We also discussed medications that were prescribed (if any) including common side effects and interactions. The patient was advised to abstain from driving, operating heavy machinery or making significant decisions while taking medications such as opiates and muscle relaxers that may impair this. All questions were addressed. They understand return precautions and discharge instructions. The patient and/or family/friend/caregiver expressed understanding. Assessment      1. Febrile illness      Plan   Discharge to home and advised to contact   Go to the emergency department for further evaluation immediately after leaving this urgent care.        Patient condition is good    New Medications     New Prescriptions    No medications on file     Electronically signed by ARIN Moreno   DD: 7/26/18  **This report was transcribed using voice recognition software. Every effort was made to ensure accuracy; however, inadvertent computerized transcription errors may be present.   END OF ED PROVIDER NOTE          Mammie Dial [address] [place], [place]  07/26/18 [address] [facility] [address] [facility] [place], [place]  07/26/18 [address] [facility]  07/26/18 [address] [place], [place]  07/26/18 [id number]

## 2018-07-26 NOTE — ED PROVIDER NOTES
EOMI, nl lids and conjunctiva   Resp:  normal resp rate, no resp distress, no stridor  CVS:  no JVD, no visible heave  GI:  no outward sign peritonitis or splinting, non distended  Musc-Skel:  full range of motion, normal appearing, no deformities/edema/ ecchymosis  Skin:  warm and dry, normal turgor, no rashes   Neurologic: awake and alert, cooperative, Cranial Nerves II-XII grossly intact, motor& sensory grossly intact x4   Psychiatric: orientated x3, answers questions appropriately, judgment & affect grossly appropriate  Heme/ Lymph: no visible adenopathy, ecchymosis, pettichiae    ------------------------- NURSING NOTES AND VITALS REVIEWED ---------------------------  Date / Time Roomed:  7/26/2018  5:29 PM  ED Bed Assignment:  09/09    The nursing notes within the ED encounter and vital signs as below have been reviewed. No data found. Oxygen Saturation Interpretation: Normal      Assessment and Plan: Workup not finding source of fever, pt has FTT and unable to do ADLs, will admit for fever workup and weakness. Impression:   1. Fatigue, unspecified type    2. Fever of unknown origin          I have reviewed the patient's medications, vital signs, and diagnostic studies available to me in the medical record at the time of the patient encounter.           Emely Garnica MD  07/31/18 [id number]

## 2018-08-02 PROBLEM — R10.9 ABDOMINAL PAIN: Status: ACTIVE | Noted: 2018-01-01

## 2018-08-03 PROBLEM — E43 SEVERE PROTEIN-CALORIE MALNUTRITION (HCC): Status: ACTIVE | Noted: 2018-01-01

## 2018-08-03 PROBLEM — E86.0 DEHYDRATION: Status: ACTIVE | Noted: 2018-01-01

## 2018-08-03 PROBLEM — R19.04 ABDOMINAL MASS, LEFT LOWER QUADRANT: Status: ACTIVE | Noted: 2018-01-01

## 2018-08-03 PROBLEM — R50.9 FEVER: Status: ACTIVE | Noted: 2018-01-01

## 2018-08-03 NOTE — CARE COORDINATION
Ss note:8/3/2018.10:26 AM Met with pt and her dtr Erica Little [phone]. Pt resides home alone in one story with basement, one step to enter. Wendy Cox relays that she and two other adult children reside next door, assist pt with laundry and cooking as needed. Dtr relays pt was independent up until about a month ago. Hx of [address], hx of snf \"years ago\" per the dtr. SW will follow for any transition of care needs.  Cathy Jung, OSVALDO

## 2018-08-03 NOTE — H&P
supple no JVD is no carotid bruit no thyromegaly  Lungs clear to auscultation and percussion  Heart regular rate and rhythm without systolic murmur at the left sternal border radiating to the apex 3/6, no gallop no rub  Abdomen soft bowel sounds present exquisite left lower quadrant tenderness with rebound no organomegaly no glossitis  Extremities no edema no calf tenderness Homans sign negative bilaterally  Rectal and pelvic exam deferred  Skin warm and dry diminished turgor  Lymph node exam unremarkable  Neuro exam unremarkable      CBC:   Lab Results   Component Value Date    WBC 11.3 08/02/2018    RBC 3.90 08/02/2018    HGB 10.7 08/02/2018    HCT 31.4 08/02/2018    MCV 80.5 08/02/2018    MCH 27.4 08/02/2018    MCHC 34.1 08/02/2018    RDW 14.5 08/02/2018     08/02/2018    MPV 10.5 08/02/2018     CMP:    Lab Results   Component Value Date     08/02/2018    K 3.5 08/02/2018    CL 91 08/02/2018    CO2 27 08/02/2018    BUN 18 08/02/2018    CREATININE 0.7 08/02/2018    GFRAA >60 08/02/2018    LABGLOM >60 08/02/2018    GLUCOSE 96 08/02/2018    GLUCOSE 95 05/31/2012    PROT 7.5 08/02/2018    LABALBU 3.0 08/02/2018    LABALBU 4.0 05/31/2012    CALCIUM 9.0 08/02/2018    BILITOT 0.7 08/02/2018    ALKPHOS 121 08/02/2018    AST 28 08/02/2018    ALT 21 08/02/2018          Assessment:  Abdominal pain fever chills  Left lower quadrant mass  Dehydration  Possible abdominal abscess rule out malignant neoplasm  Hypertension controlled  Active Problems:    Abdominal pain  Resolved Problems:    * No resolved hospital problems.  *        Plan: Admit patient IV fluids blood work IV antibiotic coverage with Cipro and Flagyl  CT scan of the abdomen and pelvis  Surgical consult  Discussed with the patient and family she wishes to be conservative, she is agreeable to limited surgery curable      Active Orders   Diet    Diet NPO Effective Now Exceptions are: Sips with Meds     Frequency: Effective Now     Number of Occurrences:

## 2018-08-03 NOTE — PLAN OF CARE
Problem: Nutrition  Goal: Optimal nutrition therapy  Outcome: Ongoing  Nutrition Problem: Severe malnutrition, in context of acute illness or injury  Intervention: Food and/or Nutrient Delivery: Continue NPO (Progress diet, start ONS when diet progressed as medically appropriate)  Nutritional Goals: Progress diet when medically appropriate

## 2018-08-03 NOTE — PROGRESS NOTES
Orders: None  · Anthropometric Measures:  · Ht: 5' 3\" (160 cm)   · Current Body Wt: 125 lb 6.4 oz (56.9 kg) (8/2 bed)  · Admission Body Wt: 125 lb 6.4 oz (56.9 kg) (8/2 bed)  · Usual Body Wt: 128 lb (58.1 kg) (7/26/18 actual wt EMR)  · % Weight Change: 1.9% loss,  8 days  · Ideal Body Wt: 115 lb (52.2 kg), % Ideal Body 109%  · BMI Classification: BMI 18.5 - 24.9 Normal Weight  · Comparative Standards (Estimated Nutrition Needs):  · Estimated Daily Total Kcal: 8639-5364 (x 1.1-1.3 SF)  · Estimated Daily Protein (g): 55-70    Estimated Intake vs Estimated Needs: Intake Less Than Needs    Nutrition Risk Level: High    Nutrition Interventions:   Continue NPO (Progress diet, start ONS when diet progressed as medically appropriate)  Continued Inpatient Monitoring, Education Not Indicated, Coordination of Care    Nutrition Evaluation:   · Evaluation: Goals set   · Goals: Progress diet when medically appropriate    · Monitoring: NPO Status, Diet Progression, Diet Tolerance, Skin Integrity, Fluid Balance, Weight, Comparative Standards, Pertinent Labs, Chewing/Swallowing, Nausea or Vomiting, Supplement Intake, Meal Intake    See Adult Nutrition Doc Flowsheet for more detail.      Electronically signed by Jackie Mitchell RD, CHANDRAKANT on 8/3/18 at 1:29 PM    Contact Number: [phone]

## 2018-08-04 NOTE — CONSULTS
Oral   Status: Final result   Order Providers     MD Joe Woodson MD          Signed by     Signed Date/Time  Phone Pager   Maisha Tang 8/03/2018 08:21 [phone]    Reading Radiologists     Read Date Phone Pager   Maisha Tang Aug 3, 2018 [phone]    Radiation Dose Estimates     No radiation information found for this patient   Narrative   Location: 200       Indication: Abdominal discomfort.       Comparison: CT abdomen/pelvis from 4/18/2013.       Technique: Multidetector CT imaging of the abdomen and pelvis was   performed after the administration of intravenous contrast (80 cc of   Isovue-370). Oral contrast was administered. Coronal and sagittal   reformatted images were obtained. Automated dose exposure control was   used for this exam.       FINDINGS:       Visualized portion of bilateral lung bases demonstrate partial   visualization of a nodular opacity within the right middle lobe. There   is stable linear left basilar subsegmental atelectasis versus   scarring. There are mild dependent changes within bilateral lower   lobes. There is no pleural effusion. There are mildly prominent right   cardiophrenic angle lymph nodes measuring up to 9 mm in short axis.       There is no evidence of pneumoperitoneum.       The liver is normal in morphology and attenuation. The gallbladder and   pancreas are unremarkable. There are multiple small calcifications   within the spleen, likely related to previous granulomatous disease.       There is a small hiatal hernia. The remainder of the stomach and   duodenum are normal in appearance. The small bowel loops are normal in   caliber. The appendix is not identified.       There is a large heterogeneous mass within the pelvis along the   sigmoid colon which demonstrates heterogeneous enhancement on the   postcontrast images and measures approximately 8.8 cm x 6.3 cm x 6.5   cm in maximal dimensions.  There is suggestion of an

## 2018-08-04 NOTE — CONSULTS
conjunctiva  Neck: supple, no masses  Lungs: Equal chest rise bilateral  Heart: Reg rate  Abdomen: soft, nnontender, nondistended, palpable large pelvic masss  Skin; no lesions  Gu: no cva tenderness  Extremities: no edema  Psych: No tremor, visual hallucinations      Radiology:  CT abd/pel:  Impression   1. Large heterogeneously enhancing mass within the pelvis along the   sigmoid colon with areas of necrosis, suspicious for neoplasm. 2. Prominent regional, marianne hepatis, and retroperitoneal   lymphadenopathy, most compatible with metastatic disease. 3. Additional mildly prominent right cardiophrenic angle lymph nodes,   suspicious for metastatic disease. Further evaluation with dedicated   CT chest with contrast is recommended. 4. Mild bilateral hydroureter without evidence of an obstructing   calculus. Assessment:  Shelli Acosta is a 80 y.o. 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   Time spent face to face with patient and family counciling and discussing care

## 2018-08-04 NOTE — CONSULTS
rub, or gallop  Abdomen:  soft, nontender, nondistended; normoactive bowel sounds; no organomegaly  Extremities:  extremities normal, atraumatic, no cyanosis or edema  Musculokeletal:  no joint swelling, no muscle tenderness. ROM normal in all joints of extremities, decreased muscle tone and bulk  Neurologic:  mental status A&Ox3, thought content appropriate; CN II-XII grossly intact; sensation intact, motor strength 5/5 globally; no slurred speech    Laboratory Data  No results found for this or any previous visit (from the past 24 hour(s)). Imaging  Ct Abdomen Pelvis W Wo Contrast Additional Contrast? Oral    Result Date: 8/3/2018  Location: 200 Indication: Abdominal discomfort. Comparison: CT abdomen/pelvis from 4/18/2013. Technique: Multidetector CT imaging of the abdomen and pelvis was performed after the administration of intravenous contrast (80 cc of Isovue-370). Oral contrast was administered. Coronal and sagittal reformatted images were obtained. Automated dose exposure control was used for this exam. FINDINGS: Visualized portion of bilateral lung bases demonstrate partial visualization of a nodular opacity within the right middle lobe. There is stable linear left basilar subsegmental atelectasis versus scarring. There are mild dependent changes within bilateral lower lobes. There is no pleural effusion. There are mildly prominent right cardiophrenic angle lymph nodes measuring up to 9 mm in short axis. There is no evidence of pneumoperitoneum. The liver is normal in morphology and attenuation. The gallbladder and pancreas are unremarkable. There are multiple small calcifications within the spleen, likely related to previous granulomatous disease. There is a small hiatal hernia. The remainder of the stomach and duodenum are normal in appearance. The small bowel loops are normal in caliber. The appendix is not identified.  There is a large heterogeneous mass within the pelvis along the sigmoid colon which

## 2018-08-05 NOTE — CONSULTS
Consults     Hematology/Oncology  Consult      Patient Name: Arcelia Baum  YOB: 1921  PCP: Alyssa Ny MD   Referring Provider:      Reason for Consultation: No chief complaint on file. History of Present Illness:  80year-old woman hospitalized with left lower quadrant pain weakness and poor appetite. CT scan of abdomen and pelvis showed a large heterogeneously enhancing mass within the pelvis along the sigmoid colon with central areas of necrosis highly suspicious for neoplasm. There was associated lesion in the marianne hepatis and retroperitoneal lymphadenopathy consistent with metastatic disease. Mild bilateral hydroureter was described without evidence of an obstructing calculus. She has preserved renal function and was seen by urology with no plans for stenting at this time. She was seen by GI because of her possible sigmoid mass and underwent flexible sigmoidoscopy with findings of diverticulosis but no malignancy. Her CBC shows moderate anemia. Her CMP is unremarkable with adequate renal function and minimally elevated AST  Her CEA was normal at 2.3  CA-125 and CA-19-9 are pending.       Diagnostic Data:     Past Medical History:   Diagnosis Date    Bronchitis     Cancer ([address]     skin CA    Hypertension     Pneumonia     Unspecified cerebral artery occlusion with cerebral infarction        Patient Active Problem List    Diagnosis Date Noted    Abdominal mass, left lower quadrant 08/03/2018     Priority: High     Class: Acute    Fever 08/03/2018     Priority: High     Class: Acute    Dehydration 08/03/2018     Priority: High     Class: Acute    Severe protein-calorie malnutrition ([address] 08/03/2018    Abdominal pain 08/02/2018    Contact dermatitis and eczema due to detergents 09/24/2013    Dysphagia causing pulmonary aspiration with swallowing 09/23/2013    Aspiration pneumonia ([address] 09/23/2013    Mass of hard palate 09/23/2013    Pneumococcal pneumonia ([address] pleasant   Head and neck : PERRLA, EOMI . Sclera non icteric. Oropharynx : Clear  Neck: no JVD,  no adenopathy  Lungs: Clear to auscultation   Heart: Regular rate and regular rhythm,  Abdomen: Soft, non-tender;no masses, no organomegaly  Extremities: No edema,no cyanosis, no clubbing. Neurologic:Cranial nerves grossly intact. No focal motor or sensory deficits . Recent Laboratory Data-   Lab Results   Component Value Date    WBC 7.0 08/05/2018    HGB 10.8 (L) 08/05/2018    HCT 32.4 (L) 08/05/2018    MCV 81.2 08/05/2018     08/05/2018    LYMPHOPCT 8.7 (L) 08/05/2018    RBC 3.99 08/05/2018    MCH 27.1 08/05/2018    MCHC 33.3 08/05/2018    RDW 14.6 08/05/2018    NEUTOPHILPCT 62.6 08/05/2018    MONOPCT 27.0 (H) 08/05/2018    BASOPCT 0.9 08/05/2018    NEUTROABS 4.41 08/05/2018    LYMPHSABS 0.63 (L) 08/05/2018    MONOSABS 1.89 (H) 08/05/2018    EOSABS 0.06 08/05/2018    BASOSABS 0.06 08/05/2018       Lab Results   Component Value Date     08/05/2018    K 3.4 (L) 08/05/2018     08/05/2018    CO2 24 08/05/2018    BUN 9 08/05/2018    CREATININE 0.8 08/05/2018    GLUCOSE 95 08/05/2018    CALCIUM 8.6 08/05/2018    PROT 6.9 08/05/2018    LABALBU 2.5 (L) 08/05/2018    BILITOT 0.5 08/05/2018    ALKPHOS 101 08/05/2018    AST 33 (H) 08/05/2018    ALT 18 08/05/2018    LABGLOM >60 08/05/2018    GFRAA >60 08/05/2018       No results found for: IRON, TIBC, FERRITIN        Radiology-    Ct Abdomen Pelvis W Wo Contrast Additional Contrast? Oral    Result Date: 8/3/2018  Location: 200 Indication: Abdominal discomfort. Comparison: CT abdomen/pelvis from 4/18/2013. Technique: Multidetector CT imaging of the abdomen and pelvis was performed after the administration of intravenous contrast (80 cc of Isovue-370). Oral contrast was administered. Coronal and sagittal reformatted images were obtained.  Automated dose exposure control was used for this exam. FINDINGS: Visualized portion of bilateral lung bases demonstrate partial visualization of a nodular opacity within the right middle lobe. There is stable linear left basilar subsegmental atelectasis versus scarring. There are mild dependent changes within bilateral lower lobes. There is no pleural effusion. There are mildly prominent right cardiophrenic angle lymph nodes measuring up to 9 mm in short axis. There is no evidence of pneumoperitoneum. The liver is normal in morphology and attenuation. The gallbladder and pancreas are unremarkable. There are multiple small calcifications within the spleen, likely related to previous granulomatous disease. There is a small hiatal hernia. The remainder of the stomach and duodenum are normal in appearance. The small bowel loops are normal in caliber. The appendix is not identified. There is a large heterogeneous mass within the pelvis along the sigmoid colon which demonstrates heterogeneous enhancement on the postcontrast images and measures approximately 8.8 cm x 6.3 cm x 6.5 cm in maximal dimensions. There is suggestion of an area of necrosis along the left posterior-inferior margin of the mass. There is wall thickening of the adjacent sigmoid colon with multiple diverticula. There is no evidence of obstruction. There are multiple prominent regional lymph nodes adjacent to the mass measuring up to 10 mm in short axis. There is prominent marianne hepatis and retroperitoneal lymphadenopathy measuring up to 16 mm in short axis. Bilateral adrenal glands are unremarkable. Bilateral kidneys are normal in morphology and demonstrate symmetric enhancement. There is a fluid attenuation cyst at the posterior cortex at the midpole of the right kidney. There is mild bilateral hydroureter without evidence of obstructing calculus. The urinary bladder is unremarkable. The abdominal aorta is normal in caliber and enhancement. There is no suspicious lytic or blastic osseous lesion.  There is mild lower lumbar spondylosis with trace anterolisthesis of L4 on

## 2018-08-05 NOTE — PROGRESS NOTES
has never smoked. She has never used smokeless tobacco.  ETOH:    reports that she does not drink alcohol. No family history on file. Recent Labs      08/02/18 2009 08/05/18   0755   WBC  11.3  7.0   HGB  10.7*  10.8*   PLT  279  274   NA  130*  136   CL  91*  101   K  3.5  3.4*   BUN  18  9   CREATININE  0.7  0.8   GLUCOSE  96  95   LABALBU  3.0*  2.5*   PROT  7.5  6.9   CALCIUM  9.0  8.6   BILITOT  0.7  0.5   ALKPHOS  121*  101   AST  28  33*   ALT  21  18       Physical exam:   VITALS:  Blood pressure (!) 172/74, pulse 83, temperature 98 °F (36.7 °C), temperature source Oral, resp. rate 18, height 5' 3\" (1.6 m), weight 125 lb 6.4 oz (56.9 kg), SpO2 94 %. General appearance: alert, appears stated age and cooperative  Head: Normocephalic, without obvious abnormality, atraumatic  Eyes: PERRL, EOMI  Neck: no adenopathy, no carotid bruit, no JVD, supple, symmetrical, trachea midline and thyroid not enlarged, symmetric, no tenderness/mass/nodules  Lungs: clear to auscultation bilaterally  Heart: regular rate and rhythm,   Abdomen: soft,  tender; bowel sounds normal; pelvic mass palpable  Extremities: extremities normal, atraumatic, no cyanosis or edema    ASSESSMENT: 80year old with LLQ pain from a large necrotic mass in the pelvis and signs of mets in the liver and retroperitoneum.  CEA normal. Sigmoidoscopy normal.    PLAN:   Oncology recommends Radiology to do a core biopsy for diagnosis    Signed: Dr. Russ Boss M.D.

## 2018-08-05 NOTE — PROGRESS NOTES
Name:  Erasmo Yarbrough  :  1921  MRN:  [id number]  Room:  35/8892-40  DOS:  2018    [facility]  [facility]  Dr. Hank Simmons M.D. Dr. Connor Cao M.D. Dr. Angelica Kline D.O. Dr. Gilma Cahrles M.D. Dr. Trina Delgado D.O.    -NP Progress Note-    PCP:  Paulino Flanagan MD  Admitting Physician:  Paulino Flanagan MD  Chief Complaint:  No chief complaint on file. Subjective  Abdominal pain mostly resolved. Denies nausea or vomiting. Tolerating liquid diet.        Physical Examination  Vitals:  BP (!) 172/74   Pulse 83   Temp 98 °F (36.7 °C) (Oral)   Resp 18   Ht 5' 3\" (1.6 m)   Wt 125 lb 6.4 oz (56.9 kg)   SpO2 94%   BMI 22.21 kg/m²   General Appearance:  awake, alert, and oriented to person, place, time, and purpose; appears stated age and cooperative; no apparent distress no labored breathing  HEENT:  PERRL; EOMI; sclera clear; buccal mucosa moist  Neck:  supple; trachea midline; no thyromegaly; no JVD; no bruits  Heart:  rhythm regular; rate controlled; no murmurs  Lungs:  symmetrical; clear to auscultation bilaterally; no wheezes; no rhonchi; no rales  Abdomen:  soft, minimally tender with deep palpation, non-distended; bowel sounds positive; no organomegaly or masses; no pain on palpation  Extremities:  peripheral pulses present; no peripheral edema; no ulcers  Neurologic:  alert and oriented x 3; no focal deficit; cranial nerves grossly intact  Skin:  no petechia; no hemorrhage; no wounds    Medications  Scheduled Meds    pantoprazole  40 mg Oral QAM AC    cloNIDine  0.2 mg Oral BID    metoprolol succinate  50 mg Oral Daily    metroNIDAZOLE  500 mg Intravenous Q12H    ciprofloxacin  400 mg Intravenous Q12H     Infusion Meds    sodium chloride 75 mL/hr at 18 0829     PRN Meds ondansetron, HYDROcodone 5 mg - acetaminophen    Laboratory Data  Recent Results (from the past 24 hour(s))   CBC WITH AUTO DIFFERENTIAL    Collection Time: 18  7:55 AM Result Value Ref Range    WBC 7.0 4.5 - 11.5 E9/L    RBC 3.99 3.50 - 5.50 E12/L    Hemoglobin 10.8 (L) 11.5 - 15.5 g/dL    Hematocrit 32.4 (L) 34.0 - 48.0 %    MCV 81.2 80.0 - 99.9 fL    MCH 27.1 26.0 - 35.0 pg    MCHC 33.3 32.0 - 34.5 %    RDW 14.6 11.5 - 15.0 fL    Platelets 410 107 - 467 E9/L    MPV 10.6 7.0 - 12.0 fL    Neutrophils % 62.6 43.0 - 80.0 %    Lymphocytes % 8.7 (L) 20.0 - 42.0 %    Monocytes % 27.0 (H) 2.0 - 12.0 %    Eosinophils % 0.9 0.0 - 6.0 %    Basophils % 0.9 0.0 - 2.0 %    Neutrophils # 4.41 1.80 - 7.30 E9/L    Lymphocytes # 0.63 (L) 1.50 - 4.00 E9/L    Monocytes # 1.89 (H) 0.10 - 0.95 E9/L    Eosinophils # 0.06 0.05 - 0.50 E9/L    Basophils # 0.06 0.00 - 0.20 E9/L   Comprehensive metabolic panel    Collection Time: 08/05/18  7:55 AM   Result Value Ref Range    Sodium 136 132 - 146 mmol/L    Potassium 3.4 (L) 3.5 - 5.0 mmol/L    Chloride 101 98 - 107 mmol/L    CO2 24 22 - 29 mmol/L    Anion Gap 11 7 - 16 mmol/L    Glucose 95 74 - 109 mg/dL    BUN 9 8 - 23 mg/dL    CREATININE 0.8 0.5 - 1.0 mg/dL    GFR Non-African American >60 >=60 mL/min/1.73    GFR African American >60     Calcium 8.6 8.6 - 10.2 mg/dL    Total Protein 6.9 6.4 - 8.3 g/dL    Alb 2.5 (L) 3.5 - 5.2 g/dL    Total Bilirubin 0.5 0.0 - 1.2 mg/dL    Alkaline Phosphatase 101 35 - 104 U/L    ALT 18 0 - 32 U/L    AST 33 (H) 0 - 31 U/L       Imaging  Ct Abdomen Pelvis W Wo Contrast Additional Contrast? Oral    Result Date: 8/3/2018  Location: 200 Indication: Abdominal discomfort. Comparison: CT abdomen/pelvis from 4/18/2013. Technique: Multidetector CT imaging of the abdomen and pelvis was performed after the administration of intravenous contrast (80 cc of Isovue-370). Oral contrast was administered. Coronal and sagittal reformatted images were obtained.  Automated dose exposure control was used for this exam. FINDINGS: Visualized portion of bilateral lung bases demonstrate partial visualization of a nodular opacity within the right within the pelvis along the sigmoid colon with areas of necrosis, suspicious for neoplasm. 2. Prominent regional, marianne hepatis, and retroperitoneal lymphadenopathy, most compatible with metastatic disease. 3. Additional mildly prominent right cardiophrenic angle lymph nodes, suspicious for metastatic disease. Further evaluation with dedicated CT chest with contrast is recommended. 4. Mild bilateral hydroureter without evidence of an obstructing calculus. Xr Chest Standard (2 Vw)    Result Date: 7/26/2018  Location:200 Exam: XR CHEST (2 VW) Comparison: 11/12/2017 History: Cough, fever, fatigue Findings: PA and lateral views were obtained. Heart size is normal. Pulmonary vessels are nondistended. No acute pulmonary parenchymal consolidation. No acute cardiopulmonary disease. Xr Chest Portable    Result Date: 7/26/2018  Location:200 Exam: XR CHEST PORTABLE Comparison: Earlier exam same date History: Fever Findings: Portable AP upright chest. Heart size is normal. Pulmonary vessels are nondistended. No focal pulmonary parenchymal consolidation. No acute cardiopulmonary disease. Xr Chest 1 Vw    Result Date: 8/3/2018  Reading location: 200 Indication: Cough, fever. Comparison: Chest radiograph from 7/26/2018. Technique: Portable upright chest radiograph was obtained. Findings: The patient is slightly rotated to the right. The cardiomediastinal silhouette is stable in size and contours. There are stable emphysematous changes. There is mild patchy bibasilar airspace opacity. Costophrenic angles are clear. There is no evidence of pneumothorax. Mild patchy bibasilar airspace disease, likely atelectasis versus pneumonia. Assessment and Plan  Patient is a 80 y.o. female on consult for pelvis mass.     1.  Pelvic mass - Mass per CT report running along the side of the sigmoid colon with areas of necrosis. Metastatic disease noted on CT. CEA pending.  Flexible sigmoidoscopy showing severe diverticular disease - no obvious mass to 30cm. Family would prefer to forego full colonoscopy. Surgery following. Per notes, planned for fine needle biopsy of mass.      2. Anemia - Mild / normocytic. Pending iron studies and FOBT. Prior EGD with hemorrhagic gastritis. PPI daily.      3. Abdominal pain - Secondary to above. Pain management per admitting. Patient / family at this time would prefer to forgo full colonoscopy. They indicate that if there is cancer, they do not plan to pursue chemotherapy or radiation. IR consulted for biopsy and possible drain placement if this is an abscess. Hgb stable. No immediate interventions planned from GI POV. Will follow.       NOEMI Eugene CNP  12:21 PM  8/5/2018

## 2018-08-06 NOTE — PROGRESS NOTES
Family opted against any aggressive measures or biopsy for tissue diagnosis . She is now DNR CC and has been referred to hospice care. Sign off.

## 2018-08-06 NOTE — PROGRESS NOTES
Assessment:    Active Problems:    Abdominal mass, left lower quadrant    Fever    Dehydration    Abdominal pain    Severe protein-calorie malnutrition ([address]  Resolved Problems:    * No resolved hospital problems. *      Plan:  1. Hospice, comfort care  2. Will add hydralazine as needed for elevated blood pressure per family's request  3.  Zofran IV as needed    See orders    Electronically signed by Kirk Yoon MD on 8/6/2018 at 1:26 PM

## 2018-08-06 NOTE — CARE COORDINATION
SS NOTE: SW met with pt and her dtr Shira Huynh. SW explained the consult for [address] [place] [address]. SW explained about the hospice choices and she agreed with having [facility]. Referral completed to [address] [place] [address] and she will meet with Tyrell Epley today. .Deon Law. .8/6/2018.11:48 AM.

## 2018-08-07 NOTE — PLAN OF CARE
Problem: Skin Integrity:  Goal: Will show no infection signs and symptoms  Will show no infection signs and symptoms   Outcome: Met This Shift

## 2018-08-07 NOTE — PROGRESS NOTES
Department of Internal Medicine  General Internal Medicine  Attending Progress Note      Subjective: The patient is  Obtunded not following commands. No problems overnight. urine output had declined. She has been hypotensive. Had a temperature of 102 earlier in the morning. I  talked to the daughter in presence of hospice nurse and family agrees to continue  current management and  discuss  Later among  themselves  discharge planning      Objective:  Pt  Sleepy  Responding only to painful stimuli    BP (!) 84/42 Comment: manual, taken twice  Pulse 104   Temp 99.6 °F (37.6 °C) (Axillary)   Resp 14   Ht 5' 3\" (1.6 m)   Wt 125 lb 6.4 oz (56.9 kg)   SpO2 94%   BMI 22.21 kg/m²     Head and Neck: normal atraumatic, no neck masses, normal thyroid, no jvd    Heart:  regular rate and rhythm, S1, S2 normal,  Positive ejection systolic murmur 3/5 left sternal border, no click, rub or gallop    Lungs:  chest clear, no wheezing, rales, normal symmetric air entry, no chest wall deformities or tenderness    Abd: soft, non-tender except for left lower quadrant fullness    Extrem:  No clubbing, cyanosis, or edema    Neuro:  Not responding to verbal stimuli    Skin: No rashes, lesions, or ecchymosis, no ulcers. Psych: No apparent anxiety, agitation, or depression. Assessment:    Active Problems:    Abdominal mass, left lower quadrant    Fever    Dehydration    Abdominal pain    Severe protein-calorie malnutrition ([address]  Resolved Problems:    * No resolved hospital problems.  *      Plan:  1.  continue current hospice care   2.  awaiting family decision Re: discharge planning   3.  continue with morphine drip and comfort care measures    See orders    Electronically signed by Matthew Alanis MD on 8/7/2018 at 1:28 PM

## 2018-08-09 NOTE — PROGRESS NOTES
GIP day 2. Visit to the [facility] room and family at the bedside. The patient is unresponsive with moist shallow respirations at 16. She has occasional cough with difficulty swallowing mucus. Her skin is warm  touch and face is flushed. Her axillary temp is 101. She has a Morphine drip at 2 mg hour. She is receiving Robinul 3 times a day. She is more comfortble when palpating lower abdomen today and does not change faciial expressions. She does not have any bowel sounds. Her urine is dark dain. Call placed to Dr. Barbara Morel. The patient remains appropriate to stay [facility] due to IV Morphine drip and unmanaged secretions and slightly labored respirations. Will continue to follow and assess on a daily basis. Hospice House offered, but patient family from [facility]  and [facility] and feel it is too far to drive. Transition booklets given to family to review and some conversation about end of life symptoms with family.

## 2018-08-09 NOTE — H&P
History and Physical      CHIEF COMPLAINT:    Patient is admitted to hospice care for palliation therapy    History of Present Illness: This is a 59-year-old white female who was admitted to the hospital with fever chills and temperature of 102 associated with abdominal pain. She had a CT scan of abdomen and pelvis which was positive for large pelvic mass adjacent to the sigmoid colon with some necrosis about 8.6 x 6.4 cm in diameter suggestive for malignancy. She also had multiple retroperitoneal lymph nodes suggestive of metastatic disease. There was also an area on the right base of lung appeared to be a solid mass again suggestive of metastatic disease. Surgery was consulted blood cultures were ordered and needle aspiration was recommended but family decided to make patient DNR CC and consult hospice care secondary to patient's age. Patient was then started on morphine drip secondary to the abdominal pain and was maintained on 2 mg per hour drip. She was admitted to hospice for palliative care. Past Medical History:   Diagnosis Date    Bronchitis     Cancer ([address]     skin CA    Hypertension     Pneumonia     Unspecified cerebral artery occlusion with cerebral infarction        Past Surgical History:   Procedure Laterality Date    COLONOSCOPY      ECHO COMPL W DOP COLOR FLOW  6/1/2012         HYSTERECTOMY      [place] SIGMOIDOSCOPY FLX DX W/COLLJ SPEC BR/WA IF PFRMD N/A 8/4/2018    ANAL PROCTO SIGMOIDOSCOPY FLEXIBLE performed by Judy Monk DO at [facility]         Medications Prior to Admission:    No prescriptions prior to admission. Allergies:    Chloral hydrate; Hydralazine; and Vistaril [hydroxyzine hcl]    Social History:    reports that she has never smoked. She has never used smokeless tobacco. She reports that she does not drink alcohol or use drugs. Family History:   family history is not on file.     REVIEW OF SYSTEMS:  As above in the HPI, otherwise hiatal hernia. The remainder of the stomach and   duodenum are normal in appearance. The small bowel loops are normal in   caliber. The appendix is not identified.       There is a large heterogeneous mass within the pelvis along the   sigmoid colon which demonstrates heterogeneous enhancement on the   postcontrast images and measures approximately 8.8 cm x 6.3 cm x 6.5   cm in maximal dimensions. There is suggestion of an area of necrosis   along the left posterior-inferior margin of the mass. There is wall   thickening of the adjacent sigmoid colon with multiple diverticula. There is no evidence of obstruction.       There are multiple prominent regional lymph nodes adjacent to the mass   measuring up to 10 mm in short axis.       There is prominent marianne hepatis and retroperitoneal lymphadenopathy   measuring up to 16 mm in short axis.       Bilateral adrenal glands are unremarkable. Bilateral kidneys are   normal in morphology and demonstrate symmetric enhancement. There is a   fluid attenuation cyst at the posterior cortex at the midpole of the   right kidney. There is mild bilateral hydroureter without evidence of   obstructing calculus. The urinary bladder is unremarkable.       The abdominal aorta is normal in caliber and enhancement.       There is no suspicious lytic or blastic osseous lesion. There is mild   lower lumbar spondylosis with trace anterolisthesis of L4 on L5 and L5   on S1.           Impression   1. Large heterogeneously enhancing mass within the pelvis along the   sigmoid colon with areas of necrosis, suspicious for neoplasm. 2. Prominent regional, marianne hepatis, and retroperitoneal   lymphadenopathy, most compatible with metastatic disease. 3. Additional mildly prominent right cardiophrenic angle lymph nodes,   suspicious for metastatic disease. Further evaluation with dedicated   CT chest with contrast is recommended.    4. Mild bilateral hydroureter without evidence of an obstructing calculus.               ASSESSMENT:      Active Problems:    Abdominal mass, left lower quadrant  Resolved Problems:    * No resolved hospital problems.  *      PLAN:    #1 admit to hospice care for palliative therapy per family's request  #2 continue with morphine drip        Please note that over 50 minutes was spent in evaluating the patient, review of records and results, discussion with staff/family, etc.      Electronically signed by Rebeca Carmen MD on 8/9/2018 at 3:07 PM

## 2018-08-11 NOTE — PLAN OF CARE
Problem: Sensory:  Goal: General experience of comfort will improve  General experience of comfort will improve  Outcome: Met This Shift

## 2018-08-13 NOTE — PROGRESS NOTES
GIP ASSESSMENT NOTE  DAY 6    Patient Name: Kera Salazar   :  1921  MRN:  [id number]  Today's Date: 2018      Current Code Status: DNR-CC No additional code details      []  Patient Reported  [x]  Information provided by proxy FAMILY, patient unable to self-report    Pain Level:  none    Location: patient unresponsive, no s/s  Frequency: none per day. Severity of no s/s  Character: patient unresponsive  Current Pain Regimen: Morphine 4 mg/hr drip  Number of Breakthrough pain doses in the last 24 hrs no PRN  Comments:     Respiratory Assessment: positive for - tachypnea and labored breathing Respiratory Rate:26  Dyspnea Yes  at rest  Current O2 Status:  5 liters/min via nasal cannula    []  Intractable cough  []  Audible gurgling  []  Unmanaged secretions as evidenced by foaming, symptoms of discomfort    Medication regimen for respiratory:  Morphine drip 4 mg/hr, robinul 0.4 mg IV three times a day  Prn medications in last 24 hrs no PRN   Comments: Morphine drip increased to 4 mg/hr for labored breathing    Nausea no nausea and no vomiting  Frequency: none per day. Last Occurred: none  Current Regimen: Zofran 8 mg IV every 8 hours as needed     Doses in the last 24 hrs 0          Effectiveness none  Comments:     Agitation None   Frequency: none per day. Last Occurred: none  Current Regimen: Ativan 0.5 mg IV  Every 4 hours as needed   Doses in the last 24 hrs none          Effectiveness none  Comments:       Other Symptoms of Higher Acuity Care requiring frequent skilled nursing/physician interventions  :  Yes   Symptom descriptions: IV medications      Patient/family goals for care: comfort    Yes Discussed symptoms with hospice Medical Director  Dr. Barbara Morel  Yes Medical director, has determined appropriate for inpatient hospice level of care  No This patient is appropriate for routine Hospice services; Radha Lucas at bedside and spoke with Jolly Whitmore over the phone.   Yesterday family had discussed possibly bringing patient home on oral Morphine. At this time family wishes for patient to be kept comfortable in the hospital since Morphine drip was just increased.       Electronically signed by Ana Lilia Jaime RN on 8/13/2018 at 11:33 AM

## 2018-08-13 NOTE — PROGRESS NOTES
Department of Internal Medicine  General Internal Medicine  Attending Progress Note      Subjective: The patient is unresponsive to verbal or painful stimuli. Shallow breathing with tachypnea  Morphine drip was increased to 4 mg per hour. Objective:  Pt unresponsive    BP (!) 78/36   Pulse 114   Temp 100.3 °F (37.9 °C) (Axillary)   Resp 28   SpO2 94%     Head and Neck: normal atraumatic, no neck masses, normal thyroid, no jvd    Heart: Tachycardia with irregularity suggesting atrial fibrillation, S1, S2 normal, no murmur, click, rub or gallop    Lungs:  chest with scattered rhonchi and very shallow breathing, no wheezing and decreased symmetric air entry, no chest wall deformities or tenderness    Abd: soft, non-tender, without masses or organomegaly    Extrem:  No clubbing, cyanosis, or edema    Neuro: no focal neurological deficit     Skin: No rashes, lesions, or ecchymosis, no ulcers. Assessment:    Active Problems:    Abdominal mass, left lower quadrant  Resolved Problems:    * No resolved hospital problems. *      Plan:  1. Counseled family regarding discharge planning. They will talk it over, among themselves to decide whether they want to keep her inpatient hospice or take Mrs. Ledesma Bending home    See orders    Electronically signed by Anjali Harmon MD on 8/13/2018 at 1:28 PM

## 2018-08-13 NOTE — PROGRESS NOTES
Department of Internal Medicine  General Internal Medicine  Attending Progress Note      Subjective: The patient is comatose unresponsive, comfortable on morphine drip    Objective:    BP (!) 98/48   Pulse 112   Temp 98.7 °F (37.1 °C) (Axillary)   Resp 22   SpO2 92%     Head and Neck: normal atraumatic, no neck masses, normal thyroid, no jvd    Heart:  regular rate and rhythm, S1, S2 normal, +syst. murmur, no click, rub or gallop    Lungs:  chest clear, no wheezing, rales, normal symmetric air entry, no chest wall deformities or tenderness    Abd: soft, non-tender, without masses or organomegaly    Extrem:  No clubbing, cyanosis, or edema      Skin: No rashes, lesions, or ecchymosis, no ulcers. Labs: none    X-Rays:  none    Assessment: Patient is adequately comfortable. Active Problems:    Abdominal mass, left lower quadrant  Resolved Problems:    * No resolved hospital problems. *      Plan:  1.  Continue comfort care, under hospice    See orders    Electronically signed by Moises Hawkins MD on 8/12/2018 at 10:33 PM

## 2018-08-13 NOTE — PROGRESS NOTES
P Quality Flow/Interdisciplinary Rounds Progress Note        Quality Flow Rounds held on August 13, 2018    Disciplines Attending:  Bedside Nurse, ,  and Nursing Unit Leadership    Ximena Barron was admitted on 8/8/2018  1:51 PM    Anticipated Discharge Date:  Expected Discharge Date: 08/10/18    Disposition:    Atul Score:  Atul Scale Score: 15    Readmission Risk              Risk of Unplanned Readmission:        10             Discussed patient goal for the day, patient clinical progression, and barriers to discharge.   The following Goal(s) of the Day/Commitment(s) have been identified:  Should DC home today with Hospice      Kade Aguilar  August 13, 2018

## 2018-09-02 PROBLEM — E86.0 DEHYDRATION: Status: RESOLVED | Noted: 2018-01-01 | Resolved: 2018-09-02

## (undated) DEVICE — GLOVE ORANGE PI 7 1/2   MSG9075

## (undated) DEVICE — READY WET SKIN SCRUB TRAY-LF: Brand: MEDLINE INDUSTRIES, INC.

## (undated) DEVICE — TUBING, SUCTION, 1/4" X 10', STRAIGHT: Brand: MEDLINE

## (undated) DEVICE — VASELINE PETROLATUM GAUZE STRIP: Brand: VASELINE

## (undated) DEVICE — TRAY PROCED CUSTOM RECTAL

## (undated) DEVICE — PENCIL ES L3M BTTN SWCH HOLSTER W/ BLDE ELECTRD EDGE

## (undated) DEVICE — SET SURG INSTR SIGMOID REUSABLE

## (undated) DEVICE — GOWN,SIRUS,NONRNF,SETINSLV,XL,20/CS: Brand: MEDLINE

## (undated) DEVICE — INTENDED FOR TISSUE SEPARATION, AND OTHER PROCEDURES THAT REQUIRE A SHARP SURGICAL BLADE TO PUNCTURE OR CUT.: Brand: BARD-PARKER ® STAINLESS STEEL BLADES

## (undated) DEVICE — DOUBLE BASIN SET: Brand: MEDLINE INDUSTRIES, INC.

## (undated) DEVICE — MARKER,SKIN,WI/RULER AND LABELS: Brand: MEDLINE

## (undated) DEVICE — NDL CNTR 40CT FM MAG: Brand: MEDLINE INDUSTRIES, INC.

## (undated) DEVICE — GAUZE,SPONGE,4"X4",16PLY,XRAY,STRL,LF: Brand: MEDLINE

## (undated) DEVICE — TOWEL,OR,DSP,ST,BLUE,STD,6/PK,12PK/CS: Brand: MEDLINE

## (undated) DEVICE — SYRINGE IRRIG 60ML SFT PLIABLE BLB EZ TO GRP 1 HND USE W/

## (undated) DEVICE — SIGMOIDOSCOPIC SUCTION INSTRUMENT 18 FR W/WINGED CAP CONTROL AND 6 FOOT (1.8M) TUBING: Brand: SIGMOIDOSCOPIC

## (undated) DEVICE — PACK,UNIVERSAL,NO GOWNS: Brand: MEDLINE

## (undated) DEVICE — GAUZE,SPONGE,4"X4",16PLY,STRL,LF,10/TRAY: Brand: MEDLINE

## (undated) DEVICE — YANKAUER,BULB TIP,W/O VENT,RIGID,STERILE: Brand: MEDLINE